# Patient Record
Sex: FEMALE | URBAN - METROPOLITAN AREA
[De-identification: names, ages, dates, MRNs, and addresses within clinical notes are randomized per-mention and may not be internally consistent; named-entity substitution may affect disease eponyms.]

---

## 2017-01-19 ENCOUNTER — APPOINTMENT (RX ONLY)
Dept: URBAN - METROPOLITAN AREA CLINIC 158 | Facility: CLINIC | Age: 72
Setting detail: DERMATOLOGY
End: 2017-01-19

## 2017-01-19 DIAGNOSIS — L91.0 HYPERTROPHIC SCAR: ICD-10-CM

## 2017-01-19 PROBLEM — Z85.828 PERSONAL HISTORY OF OTHER MALIGNANT NEOPLASM OF SKIN: Status: ACTIVE | Noted: 2017-01-19

## 2017-01-19 PROBLEM — L20.84 INTRINSIC (ALLERGIC) ECZEMA: Status: ACTIVE | Noted: 2017-01-19

## 2017-01-19 PROCEDURE — 15781 DERMABRASION SEGMENTAL FACE: CPT

## 2017-01-19 PROCEDURE — ? DERMABRASION

## 2017-01-19 ASSESSMENT — LOCATION SIMPLE DESCRIPTION DERM
LOCATION SIMPLE: RIGHT NOSE
LOCATION SIMPLE: NOSE

## 2017-01-19 ASSESSMENT — LOCATION DETAILED DESCRIPTION DERM
LOCATION DETAILED: NASAL SUPRATIP
LOCATION DETAILED: RIGHT NASAL ALAR GROOVE

## 2017-01-19 ASSESSMENT — LOCATION ZONE DERM: LOCATION ZONE: NOSE

## 2017-01-19 NOTE — PROCEDURE: DERMABRASION
Indication: hypertrophic scar
Render Post-Care Instructions In The Note?: Yes
Dressing: dry sterile dressing
Anesthesia Type: 1% lidocaine with epinephrine
Dermabrasion Method: sterile sandpaper
Wound Care: Bacitracin
Detail Level: Zone
Anesthesia Volume In Cc: 0.5
Treatment Number: 1
Vacuum Use: No
Vacuum Pressure: 10
Consent: Prior to the procedure the rationale for dermabrasion was explained to the patient and consent was obtained. The risks, benefits and alternatives to therapy were discussed in detail. Specifically, the risks of infection, reactivation of herpes virus, edema, erythema, hyperpigmentation, hypopigmentation, scarring, bleeding, prolonged wound healing, and allergy to anesthesia were addressed.
Skin Preparation: Alcohol
Post-Care Instructions: I reviewed with the patient in detail post-care instructions. Patient is to apply white petrolatum, or equivalent, multiple times a day until the treated areas are completely healed.  If antiviral medications are started they should be continued until treated areas are completely healed.  Avoid topical medications, cosmetics and sunscreens until completely healed.
Hemostasis: None
Dermabrasion Type: Localized, Face
Vacuum Pressure Units: inches Hg